# Patient Record
Sex: MALE | Race: BLACK OR AFRICAN AMERICAN | Employment: OTHER | URBAN - NONMETROPOLITAN AREA
[De-identification: names, ages, dates, MRNs, and addresses within clinical notes are randomized per-mention and may not be internally consistent; named-entity substitution may affect disease eponyms.]

---

## 2022-12-22 ENCOUNTER — OFFICE VISIT (OUTPATIENT)
Dept: NEUROLOGY | Age: 57
End: 2022-12-22
Payer: OTHER GOVERNMENT

## 2022-12-22 VITALS
OXYGEN SATURATION: 95 % | DIASTOLIC BLOOD PRESSURE: 66 MMHG | HEART RATE: 80 BPM | WEIGHT: 226 LBS | BODY MASS INDEX: 31.64 KG/M2 | HEIGHT: 71 IN | SYSTOLIC BLOOD PRESSURE: 118 MMHG

## 2022-12-22 DIAGNOSIS — G44.89 OTHER HEADACHE SYNDROME: ICD-10-CM

## 2022-12-22 DIAGNOSIS — M54.2 NECK PAIN: ICD-10-CM

## 2022-12-22 DIAGNOSIS — R41.3 MEMORY LOSS: Primary | ICD-10-CM

## 2022-12-22 PROCEDURE — 99204 OFFICE O/P NEW MOD 45 MIN: CPT | Performed by: PSYCHIATRY & NEUROLOGY

## 2022-12-22 RX ORDER — RIZATRIPTAN BENZOATE 10 MG/1
10 TABLET ORAL PRN
COMMUNITY
Start: 2022-09-06

## 2022-12-22 RX ORDER — MEMANTINE HYDROCHLORIDE 10 MG/1
10 TABLET ORAL 2 TIMES DAILY
COMMUNITY
Start: 2022-09-06

## 2022-12-22 RX ORDER — TOPIRAMATE 25 MG/1
2 TABLET ORAL DAILY
COMMUNITY
Start: 2022-11-10

## 2022-12-22 RX ORDER — CHLORAL HYDRATE 500 MG
1 CAPSULE ORAL DAILY
COMMUNITY

## 2022-12-22 RX ORDER — IBUPROFEN 400 MG/1
400 TABLET ORAL PRN
COMMUNITY
Start: 2022-08-25

## 2022-12-22 RX ORDER — GABAPENTIN 300 MG/1
900 CAPSULE ORAL 3 TIMES DAILY
COMMUNITY
Start: 2022-06-16

## 2022-12-22 RX ORDER — GUAIFENESIN 400 MG/1
400 TABLET ORAL PRN
COMMUNITY
Start: 2022-08-25

## 2022-12-22 RX ORDER — PANTOPRAZOLE SODIUM 20 MG/1
20 TABLET, DELAYED RELEASE ORAL DAILY
COMMUNITY
Start: 2021-06-08

## 2022-12-22 RX ORDER — TRAMADOL HYDROCHLORIDE 50 MG/1
1 TABLET ORAL PRN
COMMUNITY
Start: 2022-12-05

## 2022-12-22 RX ORDER — AMLODIPINE BESYLATE 10 MG/1
10 TABLET ORAL DAILY
COMMUNITY
Start: 2022-08-25

## 2022-12-22 RX ORDER — ASCORBIC ACID 500 MG
500 TABLET ORAL DAILY
COMMUNITY
Start: 2022-11-08

## 2022-12-22 RX ORDER — MONTELUKAST SODIUM 10 MG/1
10 TABLET ORAL DAILY
COMMUNITY
Start: 2021-06-08

## 2022-12-22 RX ORDER — FLUTICASONE PROPIONATE 50 MCG
SPRAY, SUSPENSION (ML) NASAL
COMMUNITY
Start: 2022-04-25

## 2022-12-22 RX ORDER — FEXOFENADINE HCL 180 MG/1
180 TABLET ORAL DAILY
COMMUNITY
Start: 2022-06-16

## 2022-12-22 RX ORDER — ZOLPIDEM TARTRATE 10 MG/1
10 TABLET ORAL NIGHTLY
COMMUNITY
Start: 2022-12-21

## 2022-12-22 RX ORDER — TAMSULOSIN HYDROCHLORIDE 0.4 MG/1
0.4 CAPSULE ORAL DAILY
COMMUNITY
Start: 2021-06-08

## 2022-12-22 RX ORDER — CYCLOSPORINE 0.5 MG/ML
EMULSION OPHTHALMIC
COMMUNITY
Start: 2022-11-02

## 2022-12-22 RX ORDER — LORAZEPAM 0.5 MG/1
0.5 TABLET ORAL 2 TIMES DAILY
COMMUNITY
Start: 2021-06-08

## 2022-12-22 RX ORDER — CALCIUM CARBONATE/VITAMIN D3 600 MG-10
2 TABLET ORAL 2 TIMES DAILY
COMMUNITY
Start: 2022-08-25

## 2022-12-22 RX ORDER — BACLOFEN 10 MG/1
1 TABLET ORAL PRN
COMMUNITY
Start: 2022-09-06

## 2022-12-22 RX ORDER — ASPIRIN 81 MG/1
81 TABLET ORAL DAILY
COMMUNITY
Start: 2022-11-08

## 2022-12-22 RX ORDER — GLIPIZIDE 5 MG/1
2.5 TABLET ORAL DAILY
COMMUNITY
Start: 2022-06-27

## 2022-12-22 NOTE — PROGRESS NOTES
REVIEW OF SYSTEMS    Constitutional: []Fever []Sweats []Chills [] Recent Injury   [x] Denies all unless marked  HENT:[x]Headache  [x] Head Injury  [] Sore Throat  [] Ear Pain  [] Dizziness [] Hearing Loss   [x] Denies all unless marked  Spine:  [x] Neck pain  [x] Back pain  [] Sciatica  [x] Denies all unless marked  Cardiovascular:[]Chest Pain []Palpitations [] Heart Disease  [x] Denies all unless marked  Pulmonary: []Shortness of Breath []Cough   [x] Denies all unless marked  Gastrointestinal:  []Abdominal Pain  []Blood in Stool  []Diarrhea []Constipation []Nausea  []Vomiting  [x] Denies all unless marked  Genitourinary:  [] Dysuria [] Frequency  [] Incontinence [] Urgency   [x] Denies all unless marked  Musculoskeletal: [] Arthralgia  [x] Myalgias [] Muscle cramps  [] Muscle twitches   [x] Denies all unless marked   Extremities:   [x] Pain   [] Swelling   [x] Denies all unless marked  Skin:[] Rash  [] Color Change  [x] Denies all unless marked  Neurological:[] Visual Disturbance [] Double Vision [] Slurred Speech [] Trouble swallowing  [] Vertigo [] Tingling [] Numbness [] Weakness [] Loss of Balance   [] Loss of Consciousness [x] Memory Loss [] Seizures  [x] Denies all unless marked  Psychiatric/Behavioral:[] Depression [] Anxiety  [x] Denies all unless marked  Sleep: [x]  Insomnia [] Sleep Disturbance [] Snoring [] Restless Legs [] Daytime Sleepiness [] Sleep Apnea  [x] Denies all unless marked
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
## 2023-02-07 ENCOUNTER — TELEPHONE (OUTPATIENT)
Dept: NEUROLOGY | Age: 58
End: 2023-02-07

## 2023-02-07 NOTE — TELEPHONE ENCOUNTER
Jo Davis spoke with the [place] and let them know that Dr. Rosa M Torres was not taking over his medications and they stated they would let the patient know and reach out to the other Neurologist that he seen. Records were faxed to the [place] after patients last appointment.

## 2023-02-07 NOTE — TELEPHONE ENCOUNTER
Nicholas Mckeon requests that  a nurse return their call. Pt has questions about getting head ache medication approved and refilled. Also has questions about records request.The best time to reach him is Anytime. Thank you.

## 2023-05-24 ENCOUNTER — TELEPHONE (OUTPATIENT)
Dept: NEUROLOGY | Age: 58
End: 2023-05-24

## 2023-05-24 NOTE — TELEPHONE ENCOUNTER
We received a referral from the [place] for a new dx, tried calling patient to schedule an appointment, no answer, no voicemail.

## 2023-07-10 ENCOUNTER — TELEPHONE (OUTPATIENT)
Dept: NEUROLOGY | Age: 58
End: 2023-07-10

## 2023-07-10 NOTE — TELEPHONE ENCOUNTER
Tried reaching patient, the [facility] auth we have on file in still eligable. Patient was seen on 12/22/22, patient just needs a follow up appointment.

## 2023-07-10 NOTE — TELEPHONE ENCOUNTER
Sonu from [place] called to follow up on Referral, nothing in tabs, but Luz Marina noted one received in May and could not reach the pt. [facility] is going [facility] reach out to patient and re send the referral. Thanks !

## 2023-07-11 NOTE — TELEPHONE ENCOUNTER
Hoa Rachel returned a missed call to the office. Patient has been scheduled for the next available appt and requested that the appt date and time and location directions please be mailed to him. Thank you.

## 2023-08-10 ENCOUNTER — OFFICE VISIT (OUTPATIENT)
Dept: NEUROLOGY | Age: 58
End: 2023-08-10
Payer: OTHER GOVERNMENT

## 2023-08-10 VITALS
SYSTOLIC BLOOD PRESSURE: 126 MMHG | HEIGHT: 71 IN | BODY MASS INDEX: 31.64 KG/M2 | HEART RATE: 72 BPM | DIASTOLIC BLOOD PRESSURE: 80 MMHG | WEIGHT: 226 LBS

## 2023-08-10 DIAGNOSIS — M54.2 NECK PAIN: ICD-10-CM

## 2023-08-10 DIAGNOSIS — R41.3 MEMORY LOSS: ICD-10-CM

## 2023-08-10 DIAGNOSIS — G43.719 INTRACTABLE CHRONIC MIGRAINE WITHOUT AURA AND WITHOUT STATUS MIGRAINOSUS: Primary | ICD-10-CM

## 2023-08-10 PROCEDURE — 99213 OFFICE O/P EST LOW 20 MIN: CPT | Performed by: PSYCHIATRY & NEUROLOGY

## 2023-08-10 RX ORDER — DULOXETIN HYDROCHLORIDE 60 MG/1
60 CAPSULE, DELAYED RELEASE ORAL DAILY
COMMUNITY

## 2023-08-10 RX ORDER — GALCANEZUMAB 120 MG/ML
1 INJECTION, SOLUTION SUBCUTANEOUS
Qty: 1 ADJUSTABLE DOSE PRE-FILLED PEN SYRINGE | Refills: 5 | Status: SHIPPED | OUTPATIENT
Start: 2023-08-10

## 2023-08-10 NOTE — PROGRESS NOTES
REVIEW OF SYSTEMS    Constitutional: []Fever []Sweat []Chills [] Recent Injury [x] Denies all unless marked  HEENT:[x]Headache  [] Head Injury/Hearing Loss  [] Sore Throat  [] Ear Ache/Dizziness  [x] Denies all unless marked  Spine:  [] Neck pain  [] Back pain  [] Sciaticia  [x] Denies all unless marked  Cardiovascular:[]Heart Disease []Chest Pain [] Palpitations  [x] Denies all unless marked  Pulmonary: []Shortness of Breath []Cough   [x] Denies all unless marke  Gastrointestinal: []Nausea  []Vomiting  []Abdominal Pain  []Constipation  []Diarrhea  []Dark Bloody Stools  [x] Denies all unless marked  Psychiatric/Behavioral:[] Depression [] Anxiety [x] Denies all unless marked  Genitourinary:   [] Frequency  [] Urgency  [] Incontinence [] Pain with Urination  [x] Denies all unless marked  Extremities: []Pain  []Swelling  [x] Denies all unless marked  Musculoskeletal: [] Muscle Pain  [] Joint Pain  [] Arthritis [] Muscle Cramps [] Muscle Twitches  [x] Denies all unless marked  Sleep: [] Insomnia [] Snoring [] Restless Legs [] Sleep Apnea  [] Daytime Sleepiness  [x] Denies all unless marked  Skin:[] Rash [] Skin Discoloration [x] Denies all unless marked   Neurological: []Visual Disturbance/Memory Loss [] Loss of Balance [] Slurred Speech/Weakness [] Seizures  [] Vertigo/Dizziness [x] Denies all unless marked
children: Not on file    Years of education: Not on file    Highest education level: Not on file   Occupational History    Not on file   Tobacco Use    Smoking status: Never    Smokeless tobacco: Never   Vaping Use    Vaping Use: Never used   Substance and Sexual Activity    Alcohol use: Never    Drug use: Never    Sexual activity: Not Currently   Other Topics Concern    Not on file   Social History Narrative    Not on file     Social Determinants of Health     Financial Resource Strain: Not on file   Food Insecurity: Not on file   Transportation Needs: Not on file   Physical Activity: Not on file   Stress: Not on file   Social Connections: Not on file   Intimate Partner Violence: Not on file   Housing Stability: Not on file       Review of Systems  Constitutional: []Fever []Sweat []Chills [] Recent Injury [x] Denies all unless marked  HEENT:[x]Headache  [] Head Injury/Hearing Loss  [] Sore Throat  [] Ear Ache/Dizziness  [x] Denies all unless marked  Spine:  [] Neck pain  [] Back pain  [] Sciaticia  [x] Denies all unless marked  Cardiovascular:[]Heart Disease []Chest Pain [] Palpitations  [x] Denies all unless marked  Pulmonary: []Shortness of Breath []Cough   [x] Denies all unless marke  Gastrointestinal: []Nausea  []Vomiting  []Abdominal Pain  []Constipation  []Diarrhea  []Dark Bloody Stools  [x] Denies all unless marked  Psychiatric/Behavioral:[] Depression [] Anxiety [x] Denies all unless marked  Genitourinary:   [] Frequency  [] Urgency  [] Incontinence [] Pain with Urination  [x] Denies all unless marked  Extremities: []Pain  []Swelling  [x] Denies all unless marked  Musculoskeletal: [] Muscle Pain  [] Joint Pain  [] Arthritis [] Muscle Cramps [] Muscle Twitches  [x] Denies all unless marked  Sleep: [] Insomnia [] Snoring [] Restless Legs [] Sleep Apnea  [] Daytime Sleepiness  [x] Denies all unless marked  Skin:[] Rash [] Skin Discoloration [x] Denies all unless marked   Neurological: []Visual

## 2023-08-24 RX ORDER — GALCANEZUMAB 120 MG/ML
1 INJECTION, SOLUTION SUBCUTANEOUS
Qty: 1 ADJUSTABLE DOSE PRE-FILLED PEN SYRINGE | Refills: 5 | Status: SHIPPED | OUTPATIENT
Start: 2023-08-24

## 2023-08-31 RX ORDER — GALCANEZUMAB 120 MG/ML
1 INJECTION, SOLUTION SUBCUTANEOUS
Qty: 1 ADJUSTABLE DOSE PRE-FILLED PEN SYRINGE | Refills: 5 | Status: SHIPPED | OUTPATIENT
Start: 2023-08-31

## 2023-08-31 NOTE — TELEPHONE ENCOUNTER
Norma from the [place] stated that the [place] pharmacist states that the [facility] script was never received and that he doubts it will be covered. In place he states the following medications will be covered:  Depakote, Topamax(patient currently take 25mg BID) , Propanolol, Nortriptyline, and Aimovig(which is no longer manufactured)      Do you want to try to prescribe the Emgality or go with one of the preferred alternatives?     Please advise      Script will need to be faxed to [place] at [phone]

## 2023-09-06 RX ORDER — DIVALPROEX SODIUM 250 MG/1
250 TABLET, DELAYED RELEASE ORAL 2 TIMES DAILY
Qty: 90 TABLET | Refills: 3 | Status: SHIPPED | OUTPATIENT
Start: 2023-09-06

## 2023-09-07 ENCOUNTER — TELEPHONE (OUTPATIENT)
Dept: NEUROLOGY | Age: 58
End: 2023-09-07

## 2023-09-07 NOTE — TELEPHONE ENCOUNTER
Spoke with pt and voiced that the [address] [place] [address] is not going to cover Emgality so Dr. Jamel Myers has sent in Depakote for him to try since it is a formulary alternative. Medication was e-scribed to the [address] [place] [address] pharmacy. Patient voiced understanding.

## 2023-09-07 NOTE — TELEPHONE ENCOUNTER
----- Message from Nura Keller MD sent at 9/6/2023  3:00 PM CDT -----  The [facility] says he needs to try Depakote, Topamax, beta-blocker or tricyclic antidepressant. We can try him on Depakote for his headache if he is willing. I will send in a prescription.

## 2023-12-14 ENCOUNTER — TELEPHONE (OUTPATIENT)
Dept: NEUROLOGY | Age: 58
End: 2023-12-14

## 2023-12-14 NOTE — TELEPHONE ENCOUNTER
Psychiatrist from [place] called stating that since patient has started his Depakote he has gained weight and his A1C has increased. She scheduled a follow up with Dr. Tawnya Hall for the patient and will send her notes over for review     She questioned why patient was not scheduled a follow up and what Dr. Tawnya Hall would be doing for the patient. I told her that she would have to send us her notes and labs and that Dr. Tawnya Hall would be able to review them.

## 2024-04-18 ENCOUNTER — OFFICE VISIT (OUTPATIENT)
Dept: NEUROLOGY | Age: 59
End: 2024-04-18

## 2024-04-18 VITALS
BODY MASS INDEX: 31.5 KG/M2 | HEART RATE: 75 BPM | DIASTOLIC BLOOD PRESSURE: 74 MMHG | HEIGHT: 71 IN | WEIGHT: 225 LBS | SYSTOLIC BLOOD PRESSURE: 130 MMHG

## 2024-04-18 DIAGNOSIS — G43.719 INTRACTABLE CHRONIC MIGRAINE WITHOUT AURA AND WITHOUT STATUS MIGRAINOSUS: Primary | ICD-10-CM

## 2024-04-18 DIAGNOSIS — R41.3 MEMORY LOSS: ICD-10-CM

## 2024-04-18 DIAGNOSIS — M54.81 BILATERAL OCCIPITAL NEURALGIA: ICD-10-CM

## 2024-04-18 DIAGNOSIS — M54.2 NECK PAIN: ICD-10-CM

## 2024-04-18 RX ORDER — EZETIMIBE 10 MG/1
5 TABLET ORAL
COMMUNITY
Start: 2023-11-14

## 2024-04-18 NOTE — PROGRESS NOTES
REVIEW OF SYSTEMS    Constitutional: []Fever []Sweat []Chills [] Recent Injury [x] Denies all unless marked  HEENT:[x]Headache  [] Head Injury/Hearing Loss  [] Sore Throat  [] Ear Ache/Dizziness  [x] Denies all unless marked  Spine:  [] Neck pain  [] Back pain  [] Sciaticia  [x] Denies all unless marked  Cardiovascular:[]Heart Disease []Chest Pain [] Palpitations  [x] Denies all unless marked  Pulmonary: []Shortness of Breath []Cough   [x] Denies all unless marke  Gastrointestinal: []Nausea  []Vomiting  []Abdominal Pain  []Constipation  []Diarrhea  []Dark Bloody Stools  [x] Denies all unless marked  Psychiatric/Behavioral:[] Depression [] Anxiety [x] Denies all unless marked  Genitourinary:   [] Frequency  [] Urgency  [] Incontinence [] Pain with Urination  [x] Denies all unless marked  Extremities: []Pain  []Swelling  [x] Denies all unless marked  Musculoskeletal: [] Muscle Pain  [] Joint Pain  [] Arthritis [] Muscle Cramps [] Muscle Twitches  [x] Denies all unless marked  Sleep: [] Insomnia [] Snoring [] Restless Legs [] Sleep Apnea  [] Daytime Sleepiness  [x] Denies all unless marked  Skin:[] Rash [] Skin Discoloration [x] Denies all unless marked   Neurological: []Visual Disturbance/Memory Loss [] Loss of Balance [] Slurred Speech/Weakness [] Seizures  [] Vertigo/Dizziness [x] Denies all unless marked
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
## 2024-07-23 ENCOUNTER — TELEPHONE (OUTPATIENT)
Dept: NEUROLOGY | Age: 59
End: 2024-07-23

## 2024-08-01 ENCOUNTER — OFFICE VISIT (OUTPATIENT)
Dept: NEUROLOGY | Age: 59
End: 2024-08-01
Payer: OTHER GOVERNMENT

## 2024-08-01 VITALS
HEIGHT: 71 IN | BODY MASS INDEX: 31.5 KG/M2 | OXYGEN SATURATION: 97 % | WEIGHT: 225 LBS | DIASTOLIC BLOOD PRESSURE: 78 MMHG | SYSTOLIC BLOOD PRESSURE: 126 MMHG

## 2024-08-01 DIAGNOSIS — R41.3 MEMORY LOSS: ICD-10-CM

## 2024-08-01 DIAGNOSIS — G43.719 INTRACTABLE CHRONIC MIGRAINE WITHOUT AURA AND WITHOUT STATUS MIGRAINOSUS: Primary | ICD-10-CM

## 2024-08-01 DIAGNOSIS — M54.2 NECK PAIN: ICD-10-CM

## 2024-08-01 DIAGNOSIS — G44.89 OTHER HEADACHE SYNDROME: ICD-10-CM

## 2024-08-01 PROCEDURE — 99213 OFFICE O/P EST LOW 20 MIN: CPT | Performed by: PSYCHIATRY & NEUROLOGY

## 2024-08-01 NOTE — PROGRESS NOTES
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
## 2024-08-15 NOTE — TELEPHONE ENCOUNTER
Requested Prescriptions     Pending Prescriptions Disp Refills    divalproex (DEPAKOTE) 250 MG DR tablet [Pharmacy Med Name: DIVALPROEX 250MG EC(DELAYED RELEASE) TAB] 90 tablet 3     Sig: TAKE ONE TABLET BY MOUTH TWICE A DAY       Last Office Visit: 8/1/2024  Next Office Visit: Visit date not found  Last Medication Refill:9/6/2023 with 3 RF

## 2024-08-16 RX ORDER — DIVALPROEX SODIUM 250 MG/1
250 TABLET, DELAYED RELEASE ORAL 2 TIMES DAILY
Qty: 90 TABLET | Refills: 3 | Status: SHIPPED | OUTPATIENT
Start: 2024-08-16

## 2024-08-19 ENCOUNTER — TELEPHONE (OUTPATIENT)
Dept: NEUROLOGY | Age: 59
End: 2024-08-19

## 2024-08-19 NOTE — TELEPHONE ENCOUNTER
Patient called asking to schedule an appointment for Botox treatment for headaches. Please return patient's call.

## 2024-08-20 NOTE — TELEPHONE ENCOUNTER
Spoke with Semaj and provided him with Botox appointment date and time for October 9, 2024 at 9:00 with Dr. Brown. Patient was informed to arrive at least 1 hr prior and to report to Outpatient Registration at the [facility], I have also mailed patient a letter . Patient voiced understanding

## 2024-10-09 ENCOUNTER — HOSPITAL ENCOUNTER (OUTPATIENT)
Dept: PAIN MANAGEMENT | Age: 59
Discharge: HOME | End: 2024-10-09
Payer: OTHER GOVERNMENT

## 2024-10-09 VITALS
OXYGEN SATURATION: 99 % | SYSTOLIC BLOOD PRESSURE: 132 MMHG | DIASTOLIC BLOOD PRESSURE: 70 MMHG | RESPIRATION RATE: 18 BRPM | TEMPERATURE: 96.7 F | HEART RATE: 87 BPM

## 2024-10-09 PROCEDURE — 6360000002 HC RX W HCPCS

## 2024-10-09 PROCEDURE — 2580000003 HC RX 258

## 2024-10-09 PROCEDURE — 64615 CHEMODENERV MUSC MIGRAINE: CPT | Performed by: PSYCHIATRY & NEUROLOGY

## 2024-10-09 PROCEDURE — 64615 CHEMODENERV MUSC MIGRAINE: CPT

## 2024-10-09 RX ORDER — SODIUM CHLORIDE 9 MG/ML
4.5 INJECTION, SOLUTION INTRAMUSCULAR; INTRAVENOUS; SUBCUTANEOUS ONCE
Status: DISCONTINUED | OUTPATIENT
Start: 2024-10-09 | End: 2024-10-11 | Stop reason: HOSPADM

## 2024-10-09 NOTE — DISCHARGE INSTRUCTIONS
[facility]  Post Procedure Discharge Instructions        YOU HAVE HAD THE FOLLOWING PROCEDURE:                                  [] Occipital Nerve Blocks  [] CTS wrist injection(s)  [] Knee Injection(s)         [] Shoulder Injection(s)   [] Elbow Injection(s)     [] Botox Injection  [] Cervical Trigger Point Injections    [] Thoracic Trigger Point Injections    [] Lumbar Trigger Point Injections  [] Piriformis Trigger Point Injections  [] SI Joint Injection(s)     [] Trochanteric Bursa Injection(s)       [] Ankle Injection(s)   [] Plantar Fasciitis   []  ______________  Injection(s) [x] Botox [x]  Migraines [] Spasticity    YOU HAVE RECEIVED THE FOLLOWING MEDICATIONS IN YOUR INJECTION(s)  [] Lidocaine [] Bupivacaine   [] DepoMedrol (steroid) [] Decadron (steroid)  []  Kenalog (steroid)   [] Toradol  [] Supartz [] Zilretta    [x] Botox        PATIENT INFORMATION:   You may experience the following symptoms after your procedure. These symptoms are normal and should not cause concern:    You may have an increase in your pain. This may last 24 - 48 hours after your procedure.  You may have no change in the pain that you had prior to your injection(s).  You may have weakness or numbness in your affected extremity. If this occurs, this may last until numbing the medication wears off.     REPORT THE FOLLOWING SYMPTOMS TO YOUR DOCTOR:  Redness, swelling or drainage at the injection site(s)  Unusual pain that interferes with your normal activities of daily living.    OTHER INSTRUCTIONS:    [x] I will apply ice to the injection site(s) for at least 24 hours after the procedure. I will rotate the ice on for 20 minutes and off for 20 minutes for at least 24 hours.    [x] I will not apply heat for at least 48 hours and I will not take a hot bath or shower for at least 24 hours.     [] I understand that if Lidocaine or Bupivacaine was used in my injection(s) that the injection site(s)

## 2024-10-09 NOTE — PROCEDURES
This patient has more than 15 days/month of headache with daily headaches typically lasting more than 4 hours.  Since getting Botox treatment they have more than 100 hours of migraine freedom per month and a greater than 50% reduction in headache days per month.[facility]    Patient Name: Semaj Pang    : 1965                    Age: 58 y.o.    Sex: male    Date: 2024    Pre-op Diagnosis: Chronic Migraines    Post-op Diagnosis: Chronic Migraines    Procedure: Botox injections x 155 units (45 units wasted)    Performing Procedure:  Larry Brown MD      Patient Vitals for the past 24 hrs:   BP Temp Temp src Pulse Resp SpO2   10/09/24 0857 132/70 (!) 96.7 °F (35.9 °C) Temporal 87 18 99 %       Previous Injections:  Patient had 15/15 headaches/migraines over the past month.   He has failed numerous meds.  Today is first botox tx.  Indications:    Semaj Pang has been treated for problems with chronic migraine headaches. The patient was taken through a conservative course of treatment without complete resolution of symptoms. Botox injection therapy was offered and after the risks and benefits of the procedure were explained to the patient, we had agreed to proceed.    The patient was taken to the procedure room and placed in the seated position. The following muscles were identified using palpation and standard landmarks. These muscles were marked and prepped with alcohol. A total of 155 units were injected after careful aspiration and the following distribution bilaterally:  10 units in 2 sites, procerus 5 units in one site, frontalis 20 units in 4 sites, temporalis 40 unit in 8 sites, occipitals 30 units in 6 sites, cervical paraspinals 20 units in 4 sites, and trapezius 30 units in 6 sites.    Discharge:  The patient tolerated the procedure well. There were no complications during the procedure and the patient was discharged home with

## 2024-11-14 ENCOUNTER — OFFICE VISIT (OUTPATIENT)
Dept: NEUROLOGY | Age: 59
End: 2024-11-14
Payer: OTHER GOVERNMENT

## 2024-11-14 VITALS
RESPIRATION RATE: 16 BRPM | WEIGHT: 225 LBS | BODY MASS INDEX: 31.5 KG/M2 | HEART RATE: 77 BPM | SYSTOLIC BLOOD PRESSURE: 140 MMHG | DIASTOLIC BLOOD PRESSURE: 86 MMHG | HEIGHT: 71 IN | OXYGEN SATURATION: 96 %

## 2024-11-14 DIAGNOSIS — G43.719 INTRACTABLE CHRONIC MIGRAINE WITHOUT AURA AND WITHOUT STATUS MIGRAINOSUS: Primary | ICD-10-CM

## 2024-11-14 DIAGNOSIS — M54.2 NECK PAIN: ICD-10-CM

## 2024-11-14 DIAGNOSIS — G44.89 OTHER HEADACHE SYNDROME: ICD-10-CM

## 2024-11-14 PROCEDURE — 99212 OFFICE O/P EST SF 10 MIN: CPT | Performed by: PSYCHIATRY & NEUROLOGY

## 2024-11-14 NOTE — PROGRESS NOTES
REVIEW OF SYSTEMS    Constitutional: []Fever []Sweat []Chills [] Recent Injury [x] Denies all unless marked  HEENT:[]Headache  [] Head Injury/Hearing Loss  [] Sore Throat  [] Ear Ache/Dizziness  [x] Denies all unless marked  Spine:  [] Neck pain  [] Back pain  [] Sciaticia  [x] Denies all unless marked  Cardiovascular:[]Heart Disease []Chest Pain [] Palpitations  [x] Denies all unless marked  Pulmonary: []Shortness of Breath []Cough   [x] Denies all unless marke  Gastrointestinal: []Nausea  []Vomiting  []Abdominal Pain  []Constipation  []Diarrhea  []Dark Bloody Stools  [x] Denies all unless marked  Psychiatric/Behavioral:[] Depression [] Anxiety [x] Denies all unless marked  Genitourinary:   [] Frequency  [] Urgency  [] Incontinence [] Pain with Urination  [x] Denies all unless marked  Extremities: []Pain  []Swelling  [x] Denies all unless marked  Musculoskeletal: [] Muscle Pain  [] Joint Pain  [] Arthritis [] Muscle Cramps [] Muscle Twitches  [x] Denies all unless marked  Sleep: [] Insomnia [] Snoring [] Restless Legs [] Sleep Apnea  [] Daytime Sleepiness  [x] Denies all unless marked  Skin:[] Rash [] Skin Discoloration [x] Denies all unless marked   Neurological: []Visual Disturbance/Memory Loss [] Loss of Balance [] Slurred Speech/Weakness [] Seizures  [] Vertigo/Dizziness [x] Denies all unless marked     
MG-MCG TABS per tab Take 2 tablets by mouth 2 times daily      cyanocobalamin 1000 MCG tablet Take 1 tablet by mouth daily      cycloSPORINE (RESTASIS) 0.05 % ophthalmic emulsion INSTILL 1 DROP IN BOTH EYES EVERY 12 HOURS FOR DRY EYES.      diclofenac sodium (VOLTAREN) 1 % GEL APPLY 2 GM TO AFFECTED AREA(S) FOUR TIMES A DAY AS NEEDED FOR PAIN/INFLAMMATION; NOT MORE THAN 16 GRAMS DAILY TO ANY LOWER EXTREMITY JOINT. NOT MORE THAN 8 GRAMS DAILY TO ANY UPPER EXTREMITY JOINT. MAX 32GM/DAY OVER ALL JOINTS      fexofenadine (ALLEGRA) 180 MG tablet Take 1 tablet by mouth daily      fluticasone (FLONASE) 50 MCG/ACT nasal spray by Nasal route      gabapentin (NEURONTIN) 300 MG capsule Take 3 capsules by mouth 3 times daily.      Galcanezumab-gnlm 120 MG/ML SOAJ Inject 120 mg into the skin every 30 days      glipiZIDE (GLUCOTROL) 5 MG tablet Take 0.5 tablets by mouth daily      guaiFENesin 400 MG tablet Take 1 tablet by mouth as needed      ibuprofen (ADVIL;MOTRIN) 400 MG tablet Take 1 tablet by mouth as needed      LORazepam (ATIVAN) 0.5 MG tablet Take 1 tablet by mouth 2 times daily.      memantine (NAMENDA) 10 MG tablet Take 1 tablet by mouth 2 times daily      metoprolol tartrate (LOPRESSOR) 25 MG tablet Take 0.5 tablets by mouth 2 times daily      montelukast (SINGULAIR) 10 MG tablet Take 1 tablet by mouth daily      pantoprazole (PROTONIX) 20 MG tablet Take 1 tablet by mouth daily      rizatriptan (MAXALT) 10 MG tablet Take 1 tablet by mouth as needed      tamsulosin (FLOMAX) 0.4 MG capsule Take 1 capsule by mouth daily      topiramate (TOPAMAX) 25 MG tablet Take 2 tablets by mouth daily      traMADol (ULTRAM) 50 MG tablet Take 1 tablet by mouth as needed.      zolpidem (AMBIEN) 10 MG tablet Take 1 tablet by mouth at bedtime.       No current facility-administered medications for this visit.       Outpatient Medications Marked as Taking for the 11/14/24 encounter (Office Visit) with Larry Brown MD   Medication Sig

## 2025-01-08 ENCOUNTER — HOSPITAL ENCOUNTER (OUTPATIENT)
Dept: PAIN MANAGEMENT | Age: 60
Discharge: HOME | End: 2025-01-08
Payer: OTHER GOVERNMENT

## 2025-01-08 VITALS
DIASTOLIC BLOOD PRESSURE: 79 MMHG | OXYGEN SATURATION: 97 % | TEMPERATURE: 96.9 F | SYSTOLIC BLOOD PRESSURE: 152 MMHG | HEART RATE: 83 BPM | RESPIRATION RATE: 16 BRPM

## 2025-01-08 PROCEDURE — 64615 CHEMODENERV MUSC MIGRAINE: CPT

## 2025-01-08 PROCEDURE — 2580000003 HC RX 258

## 2025-01-08 PROCEDURE — 64615 CHEMODENERV MUSC MIGRAINE: CPT | Performed by: PSYCHIATRY & NEUROLOGY

## 2025-01-08 PROCEDURE — 6360000002 HC RX W HCPCS

## 2025-01-08 RX ORDER — SODIUM CHLORIDE 9 MG/ML
4.5 INJECTION, SOLUTION INTRAMUSCULAR; INTRAVENOUS; SUBCUTANEOUS ONCE
Status: DISCONTINUED | OUTPATIENT
Start: 2025-01-08 | End: 2025-01-10 | Stop reason: HOSPADM

## 2025-01-08 NOTE — DISCHARGE INSTRUCTIONS
[facility]  Post Procedure Discharge Instructions        YOU HAVE HAD THE FOLLOWING PROCEDURE:                                  [] Occipital Nerve Blocks  [] CTS wrist injection(s)  [] Knee Injection(s)         [] Shoulder Injection(s)   [] Elbow Injection(s)     [x] Botox Injection  [] Cervical Trigger Point Injections    [] Thoracic Trigger Point Injections    [] Lumbar Trigger Point Injections  [] Piriformis Trigger Point Injections  [] SI Joint Injection(s)     [] Trochanteric Bursa Injection(s)       [] Ankle Injection(s)   [] Plantar Fasciitis   []  ______________  Injection(s) [x] Botox [x]  Migraines [] Spasticity    YOU HAVE RECEIVED THE FOLLOWING MEDICATIONS IN YOUR INJECTION(s)  [] Lidocaine [] Bupivacaine   [] DepoMedrol (steroid) [] Decadron (steroid)  []  Kenalog (steroid)   [] Toradol  [] Supartz [] Zilretta    [x] Botox        PATIENT INFORMATION:   You may experience the following symptoms after your procedure. These symptoms are normal and should not cause concern:    You may have an increase in your pain. This may last 24 - 48 hours after your procedure.  You may have no change in the pain that you had prior to your injection(s).  You may have weakness or numbness in your affected extremity. If this occurs, this may last until numbing the medication wears off.     REPORT THE FOLLOWING SYMPTOMS TO YOUR DOCTOR:  Redness, swelling or drainage at the injection site(s)  Unusual pain that interferes with your normal activities of daily living.    OTHER INSTRUCTIONS:    [] I will apply ice to the injection site(s) for at least 24 hours after the procedure. I will rotate the ice on for 20 minutes and off for 20 minutes for at least 24 hours.    [] I will not apply heat for at least 48 hours and I will not take a hot bath or shower for at least 24 hours.     [] I understand that if Lidocaine or Bupivacaine was used in my injection(s) that the injection site(s)

## 2025-01-08 NOTE — PROGRESS NOTES
[facility]   [address]  [place] [address]  [phone] p  [phone]    Procedure:  Level of Consciousness: [x]Alert [x]Oriented []Disoriented []Lethargic  Anxiety Level: [x]Calm []Anxious []Depressed []Other  Skin: [x]Warm [x]Dry []Cool []Moist []Intact []Other  Cardiovascular: []Palpitations: [x]Never []Occasionally []Frequently  Chest Pain: [x]No []Yes  Respiratory:  [x]Unlabored []Labored []Cough ([] Productive []Unproductive)  HCG Required: []No []Yes   Results: []Negative []Positive  Knowledge Level:        [x]Patient/Other verbalized understanding of pre-procedure instructions.        [x]Assessment of post-op care needs (transportation, responsible caregiver)        [x]Able to discuss health care problems and how to deal with it.  Factors that Affect Teaching:        Language Barrier: [x]No []Yes - why:        Hearing Loss:        [x]No []Yes            Corrective Device:  []Yes []No        Vision Loss:           []No [x]Yes            Corrective Device:  [x]Yes []No        Memory Loss:       []No []Yes            []Short Term []Long Term  Motivational Level:  [x]Asks Questions                  []Extremely Anxious       [x]Seems Interested               []Seems Uninterested                  [x]Denies need for Education  Risk for Injury:  [x]Patient oriented to person, place and time  []History of frequent falls/loss of balance  Nutritional:  []Change in appetite   []Weight Gain   []Weight Loss  Functional:  []Requires assistance with ADL's      Migraine  Follow up Assessment:  Patient experiences 45 headaches per month  Patient states that he/she has  30 headaches out of 30 days each month.  Patient states that he/she has 15 migraines out of 30 days each month.  Patient has experienced a  reduction in Migraine headaches less than 15 days per month []Yes [x]No  Patient has experienced a reduction in Migraine hours [x]Yes []No

## 2025-01-08 NOTE — PROCEDURES
This patient has more than 15 days/month of headache with daily headaches typically lasting more than 4 hours.  Since getting Botox treatment they have more than 100 hours of migraine freedom per month and a greater than 50% reduction in headache days per month.[facility]    Patient Name: Semaj Pang    : 1965                    Age: 59 y.o.    Sex: male    Date: 2025    Pre-op Diagnosis: Chronic Migraines    Post-op Diagnosis: Chronic Migraines    Procedure: Botox injections x 155 units (45 units wasted)    Performing Procedure:  Larry Brown MD      Patient Vitals for the past 24 hrs:   BP Temp Temp src Pulse Resp SpO2   25 0918 (!) 152/79 96.9 °F (36.1 °C) Temporal 83 16 97 %       Previous Injections:  Patient had 10/10 headaches/migraines over the past month.   Today is second botox.  First one helped somewhat.  Indications:    Semaj Pang has been treated for problems with chronic migraine headaches. The patient was taken through a conservative course of treatment without complete resolution of symptoms. Botox injection therapy was offered and after the risks and benefits of the procedure were explained to the patient, we had agreed to proceed.    The patient was taken to the procedure room and placed in the seated position. The following muscles were identified using palpation and standard landmarks. These muscles were marked and prepped with alcohol. A total of 155 units were injected after careful aspiration and the following distribution bilaterally:  10 units in 2 sites, procerus 5 units in one site, frontalis 20 units in 4 sites, temporalis 40 unit in 8 sites, occipitals 30 units in 6 sites, cervical paraspinals 20 units in 4 sites, and trapezius 30 units in 6 sites.    Discharge:  The patient tolerated the procedure well. There were no complications during the procedure and the patient was discharged home with

## 2025-04-09 ENCOUNTER — HOSPITAL ENCOUNTER (OUTPATIENT)
Dept: PAIN MANAGEMENT | Age: 60
Discharge: HOME | End: 2025-04-09
Payer: OTHER GOVERNMENT

## 2025-04-09 PROCEDURE — 6360000002 HC RX W HCPCS

## 2025-04-09 PROCEDURE — 64615 CHEMODENERV MUSC MIGRAINE: CPT

## 2025-04-09 PROCEDURE — 64615 CHEMODENERV MUSC MIGRAINE: CPT | Performed by: PSYCHIATRY & NEUROLOGY

## 2025-04-09 PROCEDURE — 2580000003 HC RX 258

## 2025-04-09 NOTE — PROCEDURES
This patient has more than 15 days/month of headache with daily headaches typically lasting more than 4 hours.  Since getting Botox treatment they have more than 100 hours of migraine freedom per month and a greater than 50% reduction in headache days per month.[facility]    Patient Name: Semaj Pang    : 1965                    Age: 59 y.o.    Sex: male    Date: 2025    Pre-op Diagnosis: Chronic Migraines    Post-op Diagnosis: Chronic Migraines    Procedure: Botox injections x 155 units (45 units wasted)    Performing Procedure:  Larry Brown MD      No data found.    Previous Injections:  Patient had 10/30 headaches/migraines over the past month.   Today is 3rd botox.  It is helpful  Indications:    Semaj Pang has been treated for problems with chronic migraine headaches. The patient was taken through a conservative course of treatment without complete resolution of symptoms. Botox injection therapy was offered and after the risks and benefits of the procedure were explained to the patient, we had agreed to proceed.    The patient was taken to the procedure room and placed in the seated position. The following muscles were identified using palpation and standard landmarks. These muscles were marked and prepped with alcohol. A total of 155 units were injected after careful aspiration and the following distribution bilaterally:  10 units in 2 sites, procerus 5 units in one site, frontalis 20 units in 4 sites, temporalis 40 unit in 8 sites, occipitals 30 units in 6 sites, cervical paraspinals 20 units in 4 sites, and trapezius 30 units in 6 sites.    Discharge:  The patient tolerated the procedure well. There were no complications during the procedure and the patient was discharged home with discharge instructions.    The patient has been instructed to contact the office should there be any complications or questions to arise between

## 2025-05-15 ENCOUNTER — OFFICE VISIT (OUTPATIENT)
Dept: NEUROLOGY | Age: 60
End: 2025-05-15
Payer: OTHER GOVERNMENT

## 2025-05-15 ENCOUNTER — HOSPITAL ENCOUNTER (OUTPATIENT)
Dept: GENERAL RADIOLOGY | Age: 60
Discharge: HOME | End: 2025-05-15
Payer: OTHER GOVERNMENT

## 2025-05-15 VITALS
HEART RATE: 77 BPM | OXYGEN SATURATION: 98 % | SYSTOLIC BLOOD PRESSURE: 134 MMHG | BODY MASS INDEX: 31.5 KG/M2 | WEIGHT: 225 LBS | RESPIRATION RATE: 16 BRPM | DIASTOLIC BLOOD PRESSURE: 70 MMHG | HEIGHT: 71 IN

## 2025-05-15 DIAGNOSIS — M79.602 PAIN OF LEFT UPPER EXTREMITY: ICD-10-CM

## 2025-05-15 DIAGNOSIS — G43.719 INTRACTABLE CHRONIC MIGRAINE WITHOUT AURA AND WITHOUT STATUS MIGRAINOSUS: Primary | ICD-10-CM

## 2025-05-15 DIAGNOSIS — R41.3 MEMORY LOSS: ICD-10-CM

## 2025-05-15 DIAGNOSIS — M54.2 NECK PAIN: ICD-10-CM

## 2025-05-15 PROCEDURE — 99213 OFFICE O/P EST LOW 20 MIN: CPT | Performed by: PSYCHIATRY & NEUROLOGY

## 2025-05-15 PROCEDURE — 73060 X-RAY EXAM OF HUMERUS: CPT
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
## 2025-05-22 ENCOUNTER — RESULTS FOLLOW-UP (OUTPATIENT)
Dept: NEUROLOGY | Age: 60
End: 2025-05-22

## 2025-05-22 DIAGNOSIS — M79.602 PAIN OF LEFT UPPER EXTREMITY: Primary | ICD-10-CM

## 2025-05-28 ENCOUNTER — HOSPITAL ENCOUNTER (OUTPATIENT)
Dept: MRI IMAGING | Age: 60
Discharge: HOME | End: 2025-05-28
Attending: PSYCHIATRY & NEUROLOGY
Payer: OTHER GOVERNMENT

## 2025-05-28 DIAGNOSIS — M79.602 PAIN OF LEFT UPPER EXTREMITY: ICD-10-CM

## 2025-05-28 PROCEDURE — 73221 MRI JOINT UPR EXTREM W/O DYE: CPT

## 2025-06-02 ENCOUNTER — RESULTS FOLLOW-UP (OUTPATIENT)
Dept: NEUROLOGY | Age: 60
End: 2025-06-02

## 2025-06-02 ENCOUNTER — TELEPHONE (OUTPATIENT)
Dept: NEUROLOGY | Age: 60
End: 2025-06-02

## 2025-06-02 DIAGNOSIS — M89.8X1 SHOULDER BLADE PAIN: Primary | ICD-10-CM

## 2025-06-11 ENCOUNTER — OFFICE VISIT (OUTPATIENT)
Age: 60
End: 2025-06-11

## 2025-06-11 DIAGNOSIS — M67.912 TENDINOPATHY OF LEFT ROTATOR CUFF: ICD-10-CM

## 2025-06-11 DIAGNOSIS — S43.432A GLENOID LABRAL TEAR, LEFT, INITIAL ENCOUNTER: ICD-10-CM

## 2025-06-11 DIAGNOSIS — M25.512 CHRONIC LEFT SHOULDER PAIN: Primary | ICD-10-CM

## 2025-06-11 DIAGNOSIS — G89.29 CHRONIC LEFT SHOULDER PAIN: Primary | ICD-10-CM

## 2025-06-11 RX ORDER — BETAMETHASONE SODIUM PHOSPHATE AND BETAMETHASONE ACETATE 3; 3 MG/ML; MG/ML
6 INJECTION, SUSPENSION INTRA-ARTICULAR; INTRALESIONAL; INTRAMUSCULAR; SOFT TISSUE ONCE
Status: COMPLETED | OUTPATIENT
Start: 2025-06-11 | End: 2025-06-11

## 2025-06-11 RX ORDER — BUPIVACAINE HYDROCHLORIDE 5 MG/ML
3 INJECTION, SOLUTION EPIDURAL; INTRACAUDAL; PERINEURAL ONCE
Status: COMPLETED | OUTPATIENT
Start: 2025-06-11 | End: 2025-06-11

## 2025-06-11 RX ORDER — LIDOCAINE HYDROCHLORIDE 10 MG/ML
1 INJECTION, SOLUTION INFILTRATION; PERINEURAL ONCE
Status: COMPLETED | OUTPATIENT
Start: 2025-06-11 | End: 2025-06-11

## 2025-06-11 RX ADMIN — BETAMETHASONE SODIUM PHOSPHATE AND BETAMETHASONE ACETATE 6 MG: 3; 3 INJECTION, SUSPENSION INTRA-ARTICULAR; INTRALESIONAL; INTRAMUSCULAR; SOFT TISSUE at 09:34

## 2025-06-11 RX ADMIN — LIDOCAINE HYDROCHLORIDE 1 ML: 10 INJECTION, SOLUTION INFILTRATION; PERINEURAL at 09:35

## 2025-06-11 RX ADMIN — BUPIVACAINE HYDROCHLORIDE 15 MG: 5 INJECTION, SOLUTION EPIDURAL; INTRACAUDAL; PERINEURAL at 09:35

## 2025-06-11 NOTE — PROGRESS NOTES
MG/ML SOAJ Inject 120 mg into the skin every 30 days 9/6/22  Yes Provider, MD Davion   glipiZIDE (GLUCOTROL) 5 MG tablet Take 0.5 tablets by mouth daily 6/27/22  Yes Provider, MD Davion   guaiFENesin 400 MG tablet Take 1 tablet by mouth as needed 8/25/22  Yes Provider, MD Davion   ibuprofen (ADVIL;MOTRIN) 400 MG tablet Take 1 tablet by mouth as needed 8/25/22  Yes Provider, MD Davion   LORazepam (ATIVAN) 0.5 MG tablet Take 1 tablet by mouth 2 times daily. 6/8/21  Yes Provider, MD Davion   memantine (NAMENDA) 10 MG tablet Take 1 tablet by mouth 2 times daily 9/6/22  Yes Provider, MD Davion   metoprolol tartrate (LOPRESSOR) 25 MG tablet Take 0.5 tablets by mouth 2 times daily 4/7/22  Yes Provider, MD Davion   montelukast (SINGULAIR) 10 MG tablet Take 1 tablet by mouth daily 6/8/21  Yes Provider, MD Davion   pantoprazole (PROTONIX) 20 MG tablet Take 1 tablet by mouth daily 6/8/21  Yes Provider, MD Davion   rizatriptan (MAXALT) 10 MG tablet Take 1 tablet by mouth as needed 9/6/22  Yes Provider, MD Davion   tamsulosin (FLOMAX) 0.4 MG capsule Take 1 capsule by mouth daily 6/8/21  Yes Provider, MD Davion   topiramate (TOPAMAX) 25 MG tablet Take 2 tablets by mouth daily 11/10/22  Yes Provider, MD Davion   traMADol (ULTRAM) 50 MG tablet Take 1 tablet by mouth as needed. 12/5/22  Yes Provider, MD Davion   zolpidem (AMBIEN) 10 MG tablet Take 1 tablet by mouth at bedtime. 12/21/22  Yes Provider, MD Davion       Allergies:  Piroxicam and Simvastatin    Social History:   Social History     Socioeconomic History    Marital status:      Spouse name: Not on file    Number of children: Not on file    Years of education: Not on file    Highest education level: Not on file   Occupational History    Not on file   Tobacco Use    Smoking status: Never     Passive exposure: Never    Smokeless tobacco: Never   Vaping Use    Vaping status: Never Used   Substance and

## 2025-07-02 ENCOUNTER — HOSPITAL ENCOUNTER (OUTPATIENT)
Dept: PAIN MANAGEMENT | Age: 60
Discharge: HOME | End: 2025-07-02
Payer: OTHER GOVERNMENT

## 2025-07-02 VITALS
OXYGEN SATURATION: 97 % | TEMPERATURE: 97.6 F | DIASTOLIC BLOOD PRESSURE: 89 MMHG | SYSTOLIC BLOOD PRESSURE: 157 MMHG | RESPIRATION RATE: 16 BRPM | HEART RATE: 67 BPM

## 2025-07-02 PROCEDURE — 6360000002 HC RX W HCPCS

## 2025-07-02 PROCEDURE — 2500000003 HC RX 250 WO HCPCS

## 2025-07-02 PROCEDURE — 64615 CHEMODENERV MUSC MIGRAINE: CPT | Performed by: PSYCHIATRY & NEUROLOGY

## 2025-07-02 PROCEDURE — 64615 CHEMODENERV MUSC MIGRAINE: CPT

## 2025-07-02 RX ORDER — SODIUM CHLORIDE 9 MG/ML
4.5 INJECTION, SOLUTION INTRAMUSCULAR; INTRAVENOUS; SUBCUTANEOUS ONCE
Status: DISCONTINUED | OUTPATIENT
Start: 2025-07-02 | End: 2025-07-04 | Stop reason: HOSPADM

## 2025-07-02 NOTE — PROGRESS NOTES
[facility]   [address]  [place] [address]  [phone] p  [phone]    Procedure:  Level of Consciousness: [x]Alert [x]Oriented []Disoriented []Lethargic  Anxiety Level: [x]Calm []Anxious []Depressed []Other  Skin: []Warm [x]Dry []Cool []Moist []Intact []Other  Cardiovascular: []Palpitations: [x]Never []Occasionally []Frequently  Chest Pain: [x]No []Yes  Respiratory:  [x]Unlabored []Labored []Cough ([] Productive []Unproductive)  HCG Required: [x]No []Yes   Results: []Negative []Positive  Knowledge Level:        [x]Patient/Other verbalized understanding of pre-procedure instructions.        [x]Assessment of post-op care needs (transportation, responsible caregiver)        [x]Able to discuss health care problems and how to deal with it.  Factors that Affect Teaching:        Language Barrier: [x]No []Yes - why:        Hearing Loss:        []No [x]Yes            Corrective Device:  [x]Yes []No        Vision Loss:           []No [x]Yes            Corrective Device:  [x]Yes []No        Memory Loss:       [x]No []Yes            []Short Term []Long Term  Motivational Level:  []Asks Questions                  []Extremely Anxious       []Seems Interested               []Seems Uninterested                  [x]Denies need for Education  Risk for Injury:  [x]Patient oriented to person, place and time  []History of frequent falls/loss of balance  Nutritional:  []Change in appetite   []Weight Gain   []Weight Loss  Functional:        Pt reports migraines 20/30 and headaches 20/30.  Pt reports increase in amount and number of migraines

## 2025-07-02 NOTE — PROCEDURES
This patient has more than 15 days/month of headache with daily headaches typically lasting more than 4 hours.  Since getting Botox treatment they have more than 100 hours of migraine freedom per month and a greater than 50% reduction in headache days per month.[facility]    Patient Name: Semaj Pang    : 1965                    Age: 59 y.o.    Sex: male    Date: 2025    Pre-op Diagnosis: Chronic Migraines    Post-op Diagnosis: Chronic Migraines    Procedure: Botox injections x 155 units (45 units wasted)    Performing Procedure:  Larry Brown MD      Patient Vitals for the past 24 hrs:   BP Temp Temp src Pulse Resp SpO2   25 0940 (!) 157/89 97.6 °F (36.4 °C) Temporal 67 16 97 %       Previous Injections:  Patient had 20/20 headaches/migraines over the past month.   I referred him to ortho recently for his left shoulder pathology.  He was given an injection.  Indications:    Semaj Pang has been treated for problems with chronic migraine headaches. The patient was taken through a conservative course of treatment without complete resolution of symptoms. Botox injection therapy was offered and after the risks and benefits of the procedure were explained to the patient, we had agreed to proceed.    The patient was taken to the procedure room and placed in the seated position. The following muscles were identified using palpation and standard landmarks. These muscles were marked and prepped with alcohol. A total of 155 units were injected after careful aspiration and the following distribution bilaterally:  10 units in 2 sites, procerus 5 units in one site, frontalis 20 units in 4 sites, temporalis 40 unit in 8 sites, occipitals 30 units in 6 sites, cervical paraspinals 20 units in 4 sites, and trapezius 30 units in 6 sites.    Discharge:  The patient tolerated the procedure well. There were no complications during the procedure and the

## 2025-07-02 NOTE — DISCHARGE INSTRUCTIONS
[facility]  Post Procedure Discharge Instructions        YOU HAVE HAD THE FOLLOWING PROCEDURE:                                  [] Occipital Nerve Blocks  [] CTS wrist injection(s)  [] Knee Injection(s)         [] Shoulder Injection(s)   [] Elbow Injection(s)     [x] Botox Injection  [] Cervical Trigger Point Injections    [] Thoracic Trigger Point Injections    [] Lumbar Trigger Point Injections  [] Piriformis Trigger Point Injections  [] SI Joint Injection(s)     [] Trochanteric Bursa Injection(s)       [] Ankle Injection(s)   [] Plantar Fasciitis   []  ______________  Injection(s) [x] Botox [x]  Migraines [] Spasticity    YOU HAVE RECEIVED THE FOLLOWING MEDICATIONS IN YOUR INJECTION(s)  [] Lidocaine [] Bupivacaine   [] DepoMedrol (steroid) [] Decadron (steroid)  []  Kenalog (steroid)   [] Toradol  [] Supartz [] Zilretta    [x] Botox        PATIENT INFORMATION:   You may experience the following symptoms after your procedure. These symptoms are normal and should not cause concern:    You may have an increase in your pain. This may last 24 - 48 hours after your procedure.  You may have no change in the pain that you had prior to your injection(s).  You may have weakness or numbness in your affected extremity. If this occurs, this may last until numbing the medication wears off.     REPORT THE FOLLOWING SYMPTOMS TO YOUR DOCTOR:  Redness, swelling or drainage at the injection site(s)  Unusual pain that interferes with your normal activities of daily living.    OTHER INSTRUCTIONS:    [] I will apply ice to the injection site(s) for at least 24 hours after the procedure. I will rotate the ice on for 20 minutes and off for 20 minutes for at least 24 hours.    [] I will not apply heat for at least 48 hours and I will not take a hot bath or shower for at least 24 hours.     [] I understand that if Lidocaine or Bupivacaine was used in my injection(s) that the injection site(s)